# Patient Record
Sex: MALE | Race: WHITE
[De-identification: names, ages, dates, MRNs, and addresses within clinical notes are randomized per-mention and may not be internally consistent; named-entity substitution may affect disease eponyms.]

---

## 2021-04-10 ENCOUNTER — HOSPITAL ENCOUNTER (EMERGENCY)
Dept: HOSPITAL 47 - EC | Age: 31
Discharge: HOME | End: 2021-04-10
Payer: COMMERCIAL
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
## 2021-04-10 DIAGNOSIS — S40.012A: ICD-10-CM
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
## 2021-04-10 DIAGNOSIS — S09.90XA: Primary | ICD-10-CM

## 2021-04-10 PROCEDURE — 70450 CT HEAD/BRAIN W/O DYE: CPT

## 2021-04-10 PROCEDURE — 99283 EMERGENCY DEPT VISIT LOW MDM: CPT

## 2021-04-10 PROCEDURE — 72125 CT NECK SPINE W/O DYE: CPT

## 2021-04-10 NOTE — CT
EXAMINATION TYPE: CT brain cspine wo con

 

DATE OF EXAM: 4/10/2021

 

COMPARISON: None available.

 

HISTORY: Brick fell on head and left shoulder while at work. Patient was sitting and wearing a hard h
at.

 

CT DLP: 1525.7 mGycm

Automated exposure control for dose reduction was used.

 

TECHNIQUE: CT scan of the head and cervical spine are performed without contrast.

 

FINDINGS:   There is no acute intracranial hemorrhage, mass effect, or midline shift identified.  The
 ventricles and sulci are within normal limits in size.  The globes are intact and the visualized sin
uses are clear.

 

Cervical spine is visualized in its entirety from C1 through upper thoracic levels and demonstrates s
atisfactory alignment without evidence of acute fracture or dislocation.  Prevertebral soft tissue ap
pears within normal limits.  The C1-C2 articulation is unremarkable.  

 

IMPRESSION:

1. There is no acute fracture or dislocation evident in the cervical spine.

2. No acute intracranial hemorrhage, mass effect, or midline shift is seen.

## 2021-04-10 NOTE — ED
General Adult HPI





- General


Stated complaint: [place] brick fell on head at work





- History of Present Illness


Initial comments: 





31-year-old male presents to the emergency room for a chief complaint of 

workplace injury.  Patient states a piece of concrete fell from above a doorway 

and hit him on the left side of the head and shoulder.  No loss of 

consciousness.  Patient was wearing hardhat. Patient does not take blood 

thinners.  Patient states he did take 2 naproxen.  Minimal headache.  No nausea 

or light sensitivity.  Patient has no other complaints at this time including 

shortness of breath, chest pain, abdominal pain, nausea or vomiting, or visual 

changes.





- Related Data


                                    Allergies











Allergy/AdvReac Type Severity Reaction Status Date / Time


 


No Known Allergies Allergy   Verified 04/10/21 14:33














Review of Systems


ROS Statement: 


Those systems with pertinent positive or pertinent negative responses have been 

documented in the HPI.





ROS Other: All systems not noted in ROS Statement are negative.





General Exam


General appearance: alert, in no apparent distress


Head exam: Present: atraumatic (Mild tenderness in the left side of the head.  

No ecchymosis.), normocephalic, normal inspection


Eye exam: Present: normal appearance, PERRL, EOMI.  Absent: scleral icterus, c

onjunctival injection, periorbital swelling


ENT exam: Present: normal exam, mucous membranes moist


Neck exam: Present: normal inspection, full ROM.  Absent: tenderness, 

meningismus, lymphadenopathy


Respiratory exam: Present: normal lung sounds bilaterally.  Absent: respiratory 

distress, wheezes, rales, rhonchi, stridor


Cardiovascular Exam: Present: regular rate, normal rhythm, normal heart sounds. 

Absent: systolic murmur, diastolic murmur, rubs, gallop, clicks


GI/Abdominal exam: Present: soft, normal bowel sounds.  Absent: distended, 

tenderness, guarding, rebound, rigid


Extremities exam: Present: full ROM (Full range of motion of the left shoulder),

normal capillary refill (Capillary refill less than 2 seconds, radial pulse 2+ 

in the left upper extremity.), other (Slight scrape and ecchymosis noted to the 

left shoulder blade.)





Medical Decision Making





- Medical Decision Making





HPI and physical exam as documented.  CT brain and C-spine were obtained.  There

is no acute fracture or dislocation evident in the cervical spine.  No acute 

injury hemorrhage, mass effect, or midline shift. X-ray of the left shoulder 

showed no acute osseous abnormality.  At this time patient has a contusion of 

the left shoulder.  Otherwise can be discharged home in stable condition.  No 

significant signs of concussion at this time.  Only minimal headache without 

nausea or other symptoms.  However I do recommend he monitors for these and if 

they occur he does not work.





Disposition


Clinical Impression: 


 Head injury, Shoulder contusion





Disposition: HOME SELF-CARE


Condition: Good


Instructions (If sedation given, give patient instructions):  Head Injury (ED), 

Contusion in Adults (ED)


Additional Instructions: 


Please take Motrin and Tylenol for pain.  Please follow-up with your doctor in 

one to 2 days.  Return to the emergency room for any worsening symptoms.  If you

develop severe headache, nausea, light sensitivity then do not work.


Is patient prescribed a controlled substance at d/c from ED?: No


Referrals: 


Brooklyn Altamirano MD [REFERRING] - 1-2 days


Time of Disposition: 16:06

## 2021-04-10 NOTE — XR
Result:

 

Clinical History: Pain.

 

Comparison: None available. 

 

Technique: 4 views of the left shoulder.

 

Findings: 

 

The bone mineralization is appropriate for age. 

 

No acute fracture or dislocation is seen.  The acromioclavicular and glenohumeral joints are preserve
d .  The humeral head is well-seated in the glenoid.  

 

The visualized lung is clear.  

 

 

Impression:     

 

No acute osseous abnormality.